# Patient Record
Sex: FEMALE | Race: WHITE | NOT HISPANIC OR LATINO | Employment: STUDENT | URBAN - METROPOLITAN AREA
[De-identification: names, ages, dates, MRNs, and addresses within clinical notes are randomized per-mention and may not be internally consistent; named-entity substitution may affect disease eponyms.]

---

## 2023-06-29 LAB
CHLAMYDIA TRACH., AMPLIFIED: NEGATIVE
N. GONORRHEA, AMPLIFIED: NEGATIVE
TRICHOMONAS VAGINALIS: NEGATIVE

## 2023-06-30 LAB — URINE CULTURE: NORMAL

## 2023-08-22 ENCOUNTER — OFFICE VISIT (OUTPATIENT)
Dept: PRIMARY CARE | Facility: CLINIC | Age: 23
End: 2023-08-22
Payer: COMMERCIAL

## 2023-08-22 VITALS
SYSTOLIC BLOOD PRESSURE: 130 MMHG | WEIGHT: 137 LBS | DIASTOLIC BLOOD PRESSURE: 82 MMHG | HEIGHT: 64 IN | OXYGEN SATURATION: 96 % | HEART RATE: 71 BPM | BODY MASS INDEX: 23.39 KG/M2

## 2023-08-22 DIAGNOSIS — F41.9 ANXIETY: Primary | ICD-10-CM

## 2023-08-22 DIAGNOSIS — Z92.29 UP-TO-DATE WITH IMMUNIZATIONS: ICD-10-CM

## 2023-08-22 PROCEDURE — 99395 PREV VISIT EST AGE 18-39: CPT | Performed by: FAMILY MEDICINE

## 2023-08-22 RX ORDER — SERTRALINE HYDROCHLORIDE 25 MG/1
25 TABLET, FILM COATED ORAL DAILY
Qty: 30 TABLET | Refills: 1 | Status: SHIPPED | OUTPATIENT
Start: 2023-08-22 | End: 2023-09-05

## 2023-08-22 ASSESSMENT — ENCOUNTER SYMPTOMS
RESPIRATORY NEGATIVE: 1
CONSTITUTIONAL NEGATIVE: 1
NEUROLOGICAL NEGATIVE: 1
CARDIOVASCULAR NEGATIVE: 1
NERVOUS/ANXIOUS: 1
HEMATOLOGIC/LYMPHATIC NEGATIVE: 1
GASTROINTESTINAL NEGATIVE: 1
MUSCULOSKELETAL NEGATIVE: 1

## 2023-08-22 NOTE — PROGRESS NOTES
Subjective   Patient ID: Mouna De Dios is a 22 y.o. female.    HPI  Doing patient is doing well mom concerned with anxiety and ADHD I told her we could try an SSRI but I would like to steer clear from the amphetamines at this point  Review of Systems   Constitutional: Negative.    HENT: Negative.     Respiratory: Negative.     Cardiovascular: Negative.    Gastrointestinal: Negative.    Genitourinary: Negative.    Musculoskeletal: Negative.    Neurological: Negative.    Hematological: Negative.    Psychiatric/Behavioral:  The patient is nervous/anxious.        Objective   Physical Exam  Constitutional:       Appearance: Normal appearance.   HENT:      Head: Normocephalic and atraumatic.      Nose: Nose normal.      Mouth/Throat:      Mouth: Mucous membranes are moist.   Eyes:      Extraocular Movements: Extraocular movements intact.      Pupils: Pupils are equal, round, and reactive to light.   Cardiovascular:      Rate and Rhythm: Normal rate.   Pulmonary:      Effort: Pulmonary effort is normal.      Breath sounds: Normal breath sounds.   Abdominal:      General: Abdomen is flat.      Palpations: Abdomen is soft.   Musculoskeletal:         General: Normal range of motion.      Cervical back: Normal range of motion.   Skin:     General: Skin is warm and dry.   Neurological:      Mental Status: She is alert.       Assessment/Plan   There are no diagnoses linked to this encounter.

## 2023-09-05 DIAGNOSIS — F41.9 ANXIETY: ICD-10-CM

## 2023-09-05 RX ORDER — SERTRALINE HYDROCHLORIDE 25 MG/1
25 TABLET, FILM COATED ORAL DAILY
Qty: 90 TABLET | Refills: 0 | Status: SHIPPED | OUTPATIENT
Start: 2023-09-05 | End: 2024-01-02

## 2023-12-18 ENCOUNTER — LAB (OUTPATIENT)
Dept: LAB | Facility: LAB | Age: 23
End: 2023-12-18
Payer: COMMERCIAL

## 2023-12-18 DIAGNOSIS — Z92.29 UP-TO-DATE WITH IMMUNIZATIONS: ICD-10-CM

## 2023-12-18 PROCEDURE — 86787 VARICELLA-ZOSTER ANTIBODY: CPT

## 2023-12-18 PROCEDURE — 86481 TB AG RESPONSE T-CELL SUSP: CPT

## 2023-12-18 PROCEDURE — 36415 COLL VENOUS BLD VENIPUNCTURE: CPT

## 2023-12-18 PROCEDURE — 86706 HEP B SURFACE ANTIBODY: CPT

## 2023-12-18 PROCEDURE — 86735 MUMPS ANTIBODY: CPT

## 2023-12-18 PROCEDURE — 86765 RUBEOLA ANTIBODY: CPT

## 2023-12-18 PROCEDURE — 86317 IMMUNOASSAY INFECTIOUS AGENT: CPT

## 2023-12-19 ENCOUNTER — TELEPHONE (OUTPATIENT)
Dept: PRIMARY CARE | Facility: CLINIC | Age: 23
End: 2023-12-19
Payer: COMMERCIAL

## 2023-12-19 LAB
HBV SURFACE AB SER-ACNC: 5 MIU/ML
MEV IGG SER QL IA: POSITIVE
MUMPS IGG ANTIBODY INDEX: 1.2 IA
MUV IGG SER IA-ACNC: POSITIVE
RUBEOLA IGG ANTIBODY INDEX: 2 IA
RUBV IGG SERPL IA-ACNC: 1.7 IA
RUBV IGG SERPL QL IA: POSITIVE
VARICELLA ZOSTER IGG INDEX: 2.4 IA
VZV IGG SER QL IA: POSITIVE

## 2023-12-20 LAB
NIL(NEG) CONTROL SPOT COUNT: NORMAL
PANEL A SPOT COUNT: 1
PANEL B SPOT COUNT: 0
POS CONTROL SPOT COUNT: NORMAL
T-SPOT. TB INTERPRETATION: NEGATIVE

## 2023-12-22 ENCOUNTER — TELEPHONE (OUTPATIENT)
Dept: PRIMARY CARE | Facility: CLINIC | Age: 23
End: 2023-12-22
Payer: COMMERCIAL

## 2024-01-30 ENCOUNTER — APPOINTMENT (OUTPATIENT)
Dept: PRIMARY CARE | Facility: CLINIC | Age: 24
End: 2024-01-30
Payer: COMMERCIAL

## 2024-01-31 ENCOUNTER — OFFICE VISIT (OUTPATIENT)
Dept: PRIMARY CARE | Facility: CLINIC | Age: 24
End: 2024-01-31
Payer: COMMERCIAL

## 2024-01-31 ENCOUNTER — LAB (OUTPATIENT)
Dept: LAB | Facility: LAB | Age: 24
End: 2024-01-31
Payer: COMMERCIAL

## 2024-01-31 VITALS
HEART RATE: 92 BPM | WEIGHT: 156 LBS | BODY MASS INDEX: 26.63 KG/M2 | DIASTOLIC BLOOD PRESSURE: 74 MMHG | OXYGEN SATURATION: 98 % | HEIGHT: 64 IN | SYSTOLIC BLOOD PRESSURE: 114 MMHG

## 2024-01-31 DIAGNOSIS — E66.3 OVERWEIGHT: ICD-10-CM

## 2024-01-31 DIAGNOSIS — F90.9 ATTENTION DEFICIT HYPERACTIVITY DISORDER (ADHD), UNSPECIFIED ADHD TYPE: ICD-10-CM

## 2024-01-31 DIAGNOSIS — Z13.220 LIPID SCREENING: ICD-10-CM

## 2024-01-31 DIAGNOSIS — Z00.00 ANNUAL PHYSICAL EXAM: Primary | ICD-10-CM

## 2024-01-31 PROCEDURE — 99213 OFFICE O/P EST LOW 20 MIN: CPT | Performed by: STUDENT IN AN ORGANIZED HEALTH CARE EDUCATION/TRAINING PROGRAM

## 2024-01-31 PROCEDURE — 84443 ASSAY THYROID STIM HORMONE: CPT

## 2024-01-31 PROCEDURE — 99395 PREV VISIT EST AGE 18-39: CPT | Performed by: STUDENT IN AN ORGANIZED HEALTH CARE EDUCATION/TRAINING PROGRAM

## 2024-01-31 PROCEDURE — 36415 COLL VENOUS BLD VENIPUNCTURE: CPT

## 2024-01-31 PROCEDURE — 80053 COMPREHEN METABOLIC PANEL: CPT

## 2024-01-31 PROCEDURE — 85025 COMPLETE CBC W/AUTO DIFF WBC: CPT

## 2024-01-31 PROCEDURE — 80061 LIPID PANEL: CPT

## 2024-01-31 PROCEDURE — 1036F TOBACCO NON-USER: CPT | Performed by: STUDENT IN AN ORGANIZED HEALTH CARE EDUCATION/TRAINING PROGRAM

## 2024-01-31 RX ORDER — ATOMOXETINE 40 MG/1
40 CAPSULE ORAL DAILY
Qty: 30 CAPSULE | Refills: 0 | Status: SHIPPED | OUTPATIENT
Start: 2024-02-03 | End: 2024-03-04

## 2024-01-31 NOTE — PROGRESS NOTES
"Subjective   Patient ID: Mouna De Dios is a 23 y.o. female who presents for the following    Assessment/Plan   Preventative Medicine  -UTD on vaccinations  -Sees OB for pap smears. Last pap smear last year   -Lipid panel ordered     #ADHD  -Questionnaire consistent with moderate to severe ADHD (Scanned into chart)  -Pt would like to avoid stimulant/controlled medications  PLAN  Rx Strattera pending labs   No hx of SI or attempt. No major hx of depression.   Side effect profile discussed. Patient is agreeable.       #Fhx of Thyroid disorders  #Overweight  -CBC, CMP, Lipid panel, TSH ordered    HPI  23F presents to establish care. Has a hx of ADHD and was put on zoloft by her previous PCP which did not help. Has trouble concentrating at work and completing tasks. Has childhood hx of ADHD and was previously on stimulant medications as a child. Currently does not want stimulant/controlled substance. Interested in trying strattera as she has heard good things about it. Side effects discussed and she understands. No hx of MDD or SI/attempt. No other complaints at this time.     Denies fevers, chills, weight loss, lightheadedness, dizziness, vision changes, sore throat, runny nose, CP, SOB, cough, palpitations, n/v/d, abd pain, black/bloody stools, arthralgias, or new numbness/weakness/tingling in arms/legs/face.        PMH: ADHD previously treated with zoloft    Surgeries: cosmetic surgery; breast implants 3 years ago    Social Hx  T: denies  A: denies  D: denies    Fhx: thyroid in sister    Occupation:     Single   Sexually active with 1 male partner. Does not use barrier protection.   Not on birth control  Not interested in birth control  No kids         Visit Vitals  /74   Pulse 92   Ht 1.626 m (5' 4\")   Wt 70.8 kg (156 lb)   SpO2 98%   BMI 26.78 kg/m²   Smoking Status Former   BSA 1.79 m²     PHYSICAL EXAM   Physical Exam     Visit Vitals  /74   Pulse 92   Ht 1.626 m (5' 4\")   Wt 70.8 kg " (156 lb)   SpO2 98%   BMI 26.78 kg/m²   Smoking Status Former   BSA 1.79 m²        General: NAD. NCAT. Aox3   HEENT: PERRLA. EOMI. MMM. Nares patent bl.  Cardiovascular: RRR. No MRG. S1/S2 wnl.   Respiratory: CTABL. No acute respiratory distress.   GI: Soft, NT abdomen.   MSK: ROM x 4.   Extremities: No edema. Cap refill < 2 sec.   Skin: No rashes or bruises.   Neuro: Aox3. Cranial Nerves grossly intact. Motor/sensory wnl.   Psych: Mood wnl.     REVIEW OF SYSTEMS     ROS in HPI   Allergies   Allergen Reactions    Loratadine Unknown       Current Outpatient Medications   Medication Sig Dispense Refill    etonogestrel-eluting contraceptive 68 mg implant implant 1 each by subdermal route.      sertraline (Zoloft) 25 mg tablet TAKE 1 TABLET BY MOUTH EVERY DAY 90 tablet 1     No current facility-administered medications for this visit.       Objective     Lab on 12/18/2023   Component Date Value Ref Range Status    Rubeola, IgG 12/18/2023 Positive   Final    Rubeola, IgG Index 12/18/2023 2.0 (H)  <=0.8 IA Final    Mumps, IgG 12/18/2023 Positive (A)  Negative Final    Mumps, IgG Index 12/18/2023 1.2 (H)  <=0.8 IA Final    Varicella Zoster, IgG 12/18/2023 Positive (A)  Negative Final    Varicella Zoster, IgG Index 12/18/2023 2.4 (H)  <=0.8 IA Final    Rubella, IgG 12/18/2023 Positive  Negative Final    Rubella, IgG Index 12/18/2023 1.7  <=0.7 IA IA Final    Hepatitis B Surface AB 12/18/2023 5.0  <10.0 mIU/mL Final    T-SPOT. TB Interpretation 12/18/2023 Negative  Negative Final    Panel A Spot Count 12/18/2023 1   Final    Panel B Spot Count 12/18/2023 0   Final    NIL(NEG) Control Spot Count 12/18/2023 Passed   Final    POS Control Spot Count 12/18/2023 Passed   Final       Radiology: Reviewed imaging in powerchart.  No results found.    No family history on file.  Social History     Socioeconomic History    Marital status: Single     Spouse name: None    Number of children: None    Years of education: None    Highest  education level: None   Occupational History    None   Tobacco Use    Smoking status: Former     Packs/day: 0.25     Years: 0.50     Additional pack years: 0.00     Total pack years: 0.13     Types: Cigarettes    Smokeless tobacco: Never   Substance and Sexual Activity    Alcohol use: Yes     Comment: socially    Drug use: Never    Sexual activity: Yes     Partners: Male     Birth control/protection: None   Other Topics Concern    None   Social History Narrative    None     Social Determinants of Health     Financial Resource Strain: Not on file   Food Insecurity: Not on file   Transportation Needs: Not on file   Physical Activity: Not on file   Stress: Not on file   Social Connections: Not on file   Intimate Partner Violence: Not on file   Housing Stability: Not on file     Past Medical History:   Diagnosis Date    ADHD (attention deficit hyperactivity disorder)     Anxiety     Inadequate sleep hygiene     History of difficulty sleeping    Personal history of other mental and behavioral disorders     History of depression    Vitamin D deficiency, unspecified 01/14/2016    Vitamin D deficiency     Past Surgical History:   Procedure Laterality Date    COSMETIC SURGERY      OTHER SURGICAL HISTORY  01/11/2022    Cosmetic surgery       Charting was completed using voice recognition technology and may include unintended errors.

## 2024-02-01 LAB
ALBUMIN SERPL BCP-MCNC: 4.7 G/DL (ref 3.4–5)
ALP SERPL-CCNC: 64 U/L (ref 33–110)
ALT SERPL W P-5'-P-CCNC: 25 U/L (ref 7–45)
ANION GAP SERPL CALC-SCNC: 12 MMOL/L (ref 10–20)
AST SERPL W P-5'-P-CCNC: 20 U/L (ref 9–39)
BASOPHILS # BLD AUTO: 0.06 X10*3/UL (ref 0–0.1)
BASOPHILS NFR BLD AUTO: 0.4 %
BILIRUB SERPL-MCNC: 0.4 MG/DL (ref 0–1.2)
BUN SERPL-MCNC: 12 MG/DL (ref 6–23)
CALCIUM SERPL-MCNC: 9.9 MG/DL (ref 8.6–10.6)
CHLORIDE SERPL-SCNC: 104 MMOL/L (ref 98–107)
CHOLEST SERPL-MCNC: 155 MG/DL (ref 0–199)
CHOLESTEROL/HDL RATIO: 2.3
CO2 SERPL-SCNC: 25 MMOL/L (ref 21–32)
CREAT SERPL-MCNC: 0.82 MG/DL (ref 0.5–1.05)
EGFRCR SERPLBLD CKD-EPI 2021: >90 ML/MIN/1.73M*2
EOSINOPHIL # BLD AUTO: 0.06 X10*3/UL (ref 0–0.7)
EOSINOPHIL NFR BLD AUTO: 0.4 %
ERYTHROCYTE [DISTWIDTH] IN BLOOD BY AUTOMATED COUNT: 12.4 % (ref 11.5–14.5)
GLUCOSE SERPL-MCNC: 94 MG/DL (ref 74–99)
HCT VFR BLD AUTO: 39 % (ref 36–46)
HDLC SERPL-MCNC: 67.6 MG/DL
HGB BLD-MCNC: 12.7 G/DL (ref 12–16)
IMM GRANULOCYTES # BLD AUTO: 0.05 X10*3/UL (ref 0–0.7)
IMM GRANULOCYTES NFR BLD AUTO: 0.3 % (ref 0–0.9)
LDLC SERPL CALC-MCNC: 75 MG/DL
LYMPHOCYTES # BLD AUTO: 1.63 X10*3/UL (ref 1.2–4.8)
LYMPHOCYTES NFR BLD AUTO: 10.7 %
MCH RBC QN AUTO: 28.1 PG (ref 26–34)
MCHC RBC AUTO-ENTMCNC: 32.6 G/DL (ref 32–36)
MCV RBC AUTO: 86 FL (ref 80–100)
MONOCYTES # BLD AUTO: 1.16 X10*3/UL (ref 0.1–1)
MONOCYTES NFR BLD AUTO: 7.6 %
NEUTROPHILS # BLD AUTO: 12.24 X10*3/UL (ref 1.2–7.7)
NEUTROPHILS NFR BLD AUTO: 80.6 %
NON HDL CHOLESTEROL: 87 MG/DL (ref 0–149)
NRBC BLD-RTO: 0 /100 WBCS (ref 0–0)
PLATELET # BLD AUTO: 300 X10*3/UL (ref 150–450)
POTASSIUM SERPL-SCNC: 4.2 MMOL/L (ref 3.5–5.3)
PROT SERPL-MCNC: 7 G/DL (ref 6.4–8.2)
RBC # BLD AUTO: 4.52 X10*6/UL (ref 4–5.2)
SODIUM SERPL-SCNC: 137 MMOL/L (ref 136–145)
TRIGL SERPL-MCNC: 64 MG/DL (ref 0–149)
TSH SERPL-ACNC: 1.57 MIU/L (ref 0.44–3.98)
VLDL: 13 MG/DL (ref 0–40)
WBC # BLD AUTO: 15.2 X10*3/UL (ref 4.4–11.3)

## 2024-02-02 ENCOUNTER — TELEPHONE (OUTPATIENT)
Dept: PRIMARY CARE | Facility: CLINIC | Age: 24
End: 2024-02-02
Payer: COMMERCIAL

## 2024-02-02 DIAGNOSIS — D72.829 LEUKOCYTOSIS, UNSPECIFIED TYPE: ICD-10-CM

## 2024-02-02 NOTE — TELEPHONE ENCOUNTER
Spoke to pt informed her of results. Pt expressed understanding and agreed to come in to repeat CBC.

## 2024-02-02 NOTE — TELEPHONE ENCOUNTER
----- Message from Kika Mendes MD sent at 2/1/2024  7:49 PM EST -----  Lipids and chemistries okay.   WBC is elevated. May be incidental finding. Would recommend repeat WBC next week. Please place order for CBC and have patient come in M, T, or W in office to have CBC.

## 2024-02-07 ENCOUNTER — LAB (OUTPATIENT)
Dept: LAB | Facility: LAB | Age: 24
End: 2024-02-07
Payer: COMMERCIAL

## 2024-02-07 ENCOUNTER — OFFICE VISIT (OUTPATIENT)
Dept: PRIMARY CARE | Facility: CLINIC | Age: 24
End: 2024-02-07
Payer: COMMERCIAL

## 2024-02-07 VITALS
OXYGEN SATURATION: 97 % | WEIGHT: 156 LBS | SYSTOLIC BLOOD PRESSURE: 124 MMHG | BODY MASS INDEX: 26.63 KG/M2 | HEIGHT: 64 IN | DIASTOLIC BLOOD PRESSURE: 70 MMHG | HEART RATE: 78 BPM

## 2024-02-07 DIAGNOSIS — Z79.899 MEDICATION MANAGEMENT: ICD-10-CM

## 2024-02-07 DIAGNOSIS — D72.829 LEUKOCYTOSIS, UNSPECIFIED TYPE: ICD-10-CM

## 2024-02-07 DIAGNOSIS — Z79.899 MEDICATION MANAGEMENT: Primary | ICD-10-CM

## 2024-02-07 LAB
AMPHETAMINES UR QL SCN: ABNORMAL
BARBITURATES UR QL SCN: ABNORMAL
BASOPHILS # BLD AUTO: 0.04 X10*3/UL (ref 0–0.1)
BASOPHILS NFR BLD AUTO: 0.5 %
BENZODIAZ UR QL SCN: ABNORMAL
BZE UR QL SCN: ABNORMAL
CANNABINOIDS UR QL SCN: ABNORMAL
EOSINOPHIL # BLD AUTO: 0.07 X10*3/UL (ref 0–0.7)
EOSINOPHIL NFR BLD AUTO: 0.9 %
ERYTHROCYTE [DISTWIDTH] IN BLOOD BY AUTOMATED COUNT: 12.3 % (ref 11.5–14.5)
FENTANYL+NORFENTANYL UR QL SCN: ABNORMAL
HCT VFR BLD AUTO: 36.4 % (ref 36–46)
HGB BLD-MCNC: 11.6 G/DL (ref 12–16)
IMM GRANULOCYTES # BLD AUTO: 0.02 X10*3/UL (ref 0–0.7)
IMM GRANULOCYTES NFR BLD AUTO: 0.2 % (ref 0–0.9)
LYMPHOCYTES # BLD AUTO: 1.84 X10*3/UL (ref 1.2–4.8)
LYMPHOCYTES NFR BLD AUTO: 22.7 %
MCH RBC QN AUTO: 27.4 PG (ref 26–34)
MCHC RBC AUTO-ENTMCNC: 31.9 G/DL (ref 32–36)
MCV RBC AUTO: 86 FL (ref 80–100)
MONOCYTES # BLD AUTO: 0.61 X10*3/UL (ref 0.1–1)
MONOCYTES NFR BLD AUTO: 7.5 %
NEUTROPHILS # BLD AUTO: 5.51 X10*3/UL (ref 1.2–7.7)
NEUTROPHILS NFR BLD AUTO: 68.2 %
NRBC BLD-RTO: 0 /100 WBCS (ref 0–0)
OPIATES UR QL SCN: ABNORMAL
OXYCODONE+OXYMORPHONE UR QL SCN: ABNORMAL
PCP UR QL SCN: ABNORMAL
PLATELET # BLD AUTO: 300 X10*3/UL (ref 150–450)
RBC # BLD AUTO: 4.24 X10*6/UL (ref 4–5.2)
WBC # BLD AUTO: 8.1 X10*3/UL (ref 4.4–11.3)

## 2024-02-07 PROCEDURE — 80361 OPIATES 1 OR MORE: CPT

## 2024-02-07 PROCEDURE — 80365 DRUG SCREENING OXYCODONE: CPT

## 2024-02-07 PROCEDURE — 36415 COLL VENOUS BLD VENIPUNCTURE: CPT

## 2024-02-07 PROCEDURE — 1036F TOBACCO NON-USER: CPT | Performed by: STUDENT IN AN ORGANIZED HEALTH CARE EDUCATION/TRAINING PROGRAM

## 2024-02-07 PROCEDURE — 99214 OFFICE O/P EST MOD 30 MIN: CPT | Performed by: STUDENT IN AN ORGANIZED HEALTH CARE EDUCATION/TRAINING PROGRAM

## 2024-02-07 PROCEDURE — 80307 DRUG TEST PRSMV CHEM ANLYZR: CPT

## 2024-02-07 PROCEDURE — 85025 COMPLETE CBC W/AUTO DIFF WBC: CPT

## 2024-02-07 RX ORDER — DEXTROAMPHETAMINE SACCHARATE, AMPHETAMINE ASPARTATE MONOHYDRATE, DEXTROAMPHETAMINE SULFATE AND AMPHETAMINE SULFATE 1.25; 1.25; 1.25; 1.25 MG/1; MG/1; MG/1; MG/1
5 CAPSULE, EXTENDED RELEASE ORAL EVERY MORNING
Refills: 0 | Status: CANCELLED | OUTPATIENT
Start: 2024-02-07 | End: 2024-03-08

## 2024-02-07 NOTE — PROGRESS NOTES
Subjective   Patient ID: Mouna De Dios is a 23 y.o. female who presents for the following        UPDATE:  2/9/2024. UDS is positive for opioids. Pt informed that I cannot prescribe adderall at this time. She will have to follow up with Psychiatry and have ADHD evaluation done. If prescribed via Psychiatry, we will consider doing her refills through the office.       Assessment/Plan   Preventative Medicine  -UTD on vaccinations  -Sees OB for pap smears. Last pap smear last year     #ADHD  -Questionnaire consistent with moderate to severe ADHD (Scanned into chart)  -Pt would like to avoid stimulant/controlled medications  PLAN  Adderall 5mg XR PO daily x 30 days  Follow up in 1 month   No hx of SI or attempt. No major hx of depression.     #Fhx of Thyroid disorders  #Overweight  -TSH wnl  -Lipids okay    #Leukocytosis; NOS  -No fevers, chills, or systemic si/sx  -Pt states that she felt sick when she last saw me but is feeling better now.   -Repeat CBC at next visit     >31 minutes spent on complete E&M of this patient.     OARRS Report Reviewed and appropriate.  UDS reviewed.  I have personally reviewed the OARRS report.  I have considered the risks of abuse, dependence, addiction and diversion. I believe that it is clinically appropriate for this patient to be prescribed this medication based on documented diagnosis.     Controlled Substance Agreement:   I have printed this form and reviewed each line item with the patient and the patient has verbalized understanding.   Date of the last Controlled Substance Agreement: 2/7/2024  Date of UDS 2/7/2024    I discussed patient's OARRS report as well as the potential concern for overdose on prescribed opioid, sleep aid, gabapentin/Lyrica, and/or benzodiazepines. I explained that Overdose Risk Scores >460, require a Narcan prescription and places the patient at risk for potential death.      Patient understands that the risk of death can occur when using opioid,  "benzodiazepines, gabapentin, Lyrica, sleep aids, and illegal drugs. The risk of death especially increases when using the above medications and or illegal drugs in combination. I have reviewed with the patient and the patient is in agreement with the terms of the  Controlled Substance Agreement.      At this point in time, patient is in compliance with the above, and has no signs of dependence or addiction to the above prescribed medications.         HPI    23F presents for followup visit. Has a hx of ADHD and was   put on zoloft by her previous PCP which did not help. Has trouble concentrating at work and completing tasks. She's in school for nursing and she states that she is having trouble concentrating and completing work on time. Previously we were going to do Strattera at the patients request, but after reading of the side effects, patient did not want to get on it. We discussed various options and the risks and benefits of these medications. We are deciding on starting Adderral.       Has childhood hx of ADHD and was previously on stimulant medications as a child.     No hx of MDD or SI/attempt. No other complaints at this time.     Denies fevers, chills, weight loss, lightheadedness, dizziness, vision changes, sore throat, runny nose, CP, SOB, cough, palpitations, n/v/d, abd pain, black/bloody stools, arthralgias, or new numbness/weakness/tingling in arms/legs/face.        PMH: ADHD previously treated with zoloft    Surgeries: cosmetic surgery; breast implants 3 years ago    Social Hx  T: denies  A: denies  D: denies    Fhx: thyroid in sister    Occupation:     Single   Sexually active with 1 male partner. Does not use barrier protection.   Not on birth control  Not interested in birth control  No kids         Visit Vitals  /70   Pulse 78   Ht 1.626 m (5' 4\")   Wt 70.8 kg (156 lb)   SpO2 97%   BMI 26.78 kg/m²   Smoking Status Former   BSA 1.79 m²     PHYSICAL EXAM   Physical Exam     Visit " "Vitals  /70   Pulse 78   Ht 1.626 m (5' 4\")   Wt 70.8 kg (156 lb)   SpO2 97%   BMI 26.78 kg/m²   Smoking Status Former   BSA 1.79 m²        General: NAD. NCAT. Aox3   HEENT: PERRLA. EOMI. MMM. Nares patent bl.  Cardiovascular: RRR. No MRG. S1/S2 wnl.   Respiratory: CTABL. No acute respiratory distress.   GI: Soft, NT abdomen.   MSK: ROM x 4.   Extremities: No edema. Cap refill < 2 sec.   Skin: No rashes or bruises.   Neuro: Aox3. Cranial Nerves grossly intact. Motor/sensory wnl.   Psych: Mood wnl.     REVIEW OF SYSTEMS     ROS in HPI   Allergies   Allergen Reactions    Loratadine Unknown       Current Outpatient Medications   Medication Sig Dispense Refill    atomoxetine (Strattera) 40 mg capsule Take 1 capsule (40 mg) by mouth once daily. Swallow capsule whole; do not open. If opened accidentally, do not touch eyes; wash hands immediately (product is an eye irritant). Do not start before February 3, 2024. (Patient not taking: Reported on 2/7/2024) 30 capsule 0     No current facility-administered medications for this visit.       Objective     Lab on 01/31/2024   Component Date Value Ref Range Status    WBC 01/31/2024 15.2 (H)  4.4 - 11.3 x10*3/uL Final    nRBC 01/31/2024 0.0  0.0 - 0.0 /100 WBCs Final    RBC 01/31/2024 4.52  4.00 - 5.20 x10*6/uL Final    Hemoglobin 01/31/2024 12.7  12.0 - 16.0 g/dL Final    Hematocrit 01/31/2024 39.0  36.0 - 46.0 % Final    MCV 01/31/2024 86  80 - 100 fL Final    MCH 01/31/2024 28.1  26.0 - 34.0 pg Final    MCHC 01/31/2024 32.6  32.0 - 36.0 g/dL Final    RDW 01/31/2024 12.4  11.5 - 14.5 % Final    Platelets 01/31/2024 300  150 - 450 x10*3/uL Final    Neutrophils % 01/31/2024 80.6  40.0 - 80.0 % Final    Immature Granulocytes %, Automated 01/31/2024 0.3  0.0 - 0.9 % Final    Lymphocytes % 01/31/2024 10.7  13.0 - 44.0 % Final    Monocytes % 01/31/2024 7.6  2.0 - 10.0 % Final    Eosinophils % 01/31/2024 0.4  0.0 - 6.0 % Final    Basophils % 01/31/2024 0.4  0.0 - 2.0 % Final    " Neutrophils Absolute 01/31/2024 12.24 (H)  1.20 - 7.70 x10*3/uL Final    Immature Granulocytes Absolute, Au* 01/31/2024 0.05  0.00 - 0.70 x10*3/uL Final    Lymphocytes Absolute 01/31/2024 1.63  1.20 - 4.80 x10*3/uL Final    Monocytes Absolute 01/31/2024 1.16 (H)  0.10 - 1.00 x10*3/uL Final    Eosinophils Absolute 01/31/2024 0.06  0.00 - 0.70 x10*3/uL Final    Basophils Absolute 01/31/2024 0.06  0.00 - 0.10 x10*3/uL Final    Glucose 01/31/2024 94  74 - 99 mg/dL Final    Sodium 01/31/2024 137  136 - 145 mmol/L Final    Potassium 01/31/2024 4.2  3.5 - 5.3 mmol/L Final    Chloride 01/31/2024 104  98 - 107 mmol/L Final    Bicarbonate 01/31/2024 25  21 - 32 mmol/L Final    Anion Gap 01/31/2024 12  10 - 20 mmol/L Final    Urea Nitrogen 01/31/2024 12  6 - 23 mg/dL Final    Creatinine 01/31/2024 0.82  0.50 - 1.05 mg/dL Final    eGFR 01/31/2024 >90  >60 mL/min/1.73m*2 Final    Calcium 01/31/2024 9.9  8.6 - 10.6 mg/dL Final    Albumin 01/31/2024 4.7  3.4 - 5.0 g/dL Final    Alkaline Phosphatase 01/31/2024 64  33 - 110 U/L Final    Total Protein 01/31/2024 7.0  6.4 - 8.2 g/dL Final    AST 01/31/2024 20  9 - 39 U/L Final    Bilirubin, Total 01/31/2024 0.4  0.0 - 1.2 mg/dL Final    ALT 01/31/2024 25  7 - 45 U/L Final    Cholesterol 01/31/2024 155  0 - 199 mg/dL Final    HDL-Cholesterol 01/31/2024 67.6  mg/dL Final    Cholesterol/HDL Ratio 01/31/2024 2.3   Final    LDL Calculated 01/31/2024 75  <=119 mg/dL Final    VLDL 01/31/2024 13  0 - 40 mg/dL Final    Triglycerides 01/31/2024 64  0 - 149 mg/dL Final    Non HDL Cholesterol 01/31/2024 87  0 - 149 mg/dL Final    Thyroid Stimulating Hormone 01/31/2024 1.57  0.44 - 3.98 mIU/L Final   Lab on 12/18/2023   Component Date Value Ref Range Status    Rubeola, IgG 12/18/2023 Positive   Final    Rubeola, IgG Index 12/18/2023 2.0 (H)  <=0.8 IA Final    Mumps, IgG 12/18/2023 Positive (A)  Negative Final    Mumps, IgG Index 12/18/2023 1.2 (H)  <=0.8 IA Final    Varicella Zoster, IgG  12/18/2023 Positive (A)  Negative Final    Varicella Zoster, IgG Index 12/18/2023 2.4 (H)  <=0.8 IA Final    Rubella, IgG 12/18/2023 Positive  Negative Final    Rubella, IgG Index 12/18/2023 1.7  <=0.7 IA IA Final    Hepatitis B Surface AB 12/18/2023 5.0  <10.0 mIU/mL Final    T-SPOT. TB Interpretation 12/18/2023 Negative  Negative Final    Panel A Spot Count 12/18/2023 1   Final    Panel B Spot Count 12/18/2023 0   Final    NIL(NEG) Control Spot Count 12/18/2023 Passed   Final    POS Control Spot Count 12/18/2023 Passed   Final       Radiology: Reviewed imaging in powerchart.  No results found.    No family history on file.  Social History     Socioeconomic History    Marital status: Single     Spouse name: None    Number of children: None    Years of education: None    Highest education level: None   Occupational History    None   Tobacco Use    Smoking status: Former     Packs/day: 0.25     Years: 0.50     Additional pack years: 0.00     Total pack years: 0.13     Types: Cigarettes    Smokeless tobacco: Never   Substance and Sexual Activity    Alcohol use: Yes     Comment: socially    Drug use: Never    Sexual activity: Yes     Partners: Male     Birth control/protection: None   Other Topics Concern    None   Social History Narrative    None     Social Determinants of Health     Financial Resource Strain: Not on file   Food Insecurity: Not on file   Transportation Needs: Not on file   Physical Activity: Not on file   Stress: Not on file   Social Connections: Not on file   Intimate Partner Violence: Not on file   Housing Stability: Not on file     Past Medical History:   Diagnosis Date    ADHD (attention deficit hyperactivity disorder)     Anxiety     Inadequate sleep hygiene     History of difficulty sleeping    Personal history of other mental and behavioral disorders     History of depression    Vitamin D deficiency, unspecified 01/14/2016    Vitamin D deficiency     Past Surgical History:   Procedure  Laterality Date    COSMETIC SURGERY      OTHER SURGICAL HISTORY  01/11/2022    Cosmetic surgery       Charting was completed using voice recognition technology and may include unintended errors.

## 2024-02-08 ENCOUNTER — TELEPHONE (OUTPATIENT)
Dept: PRIMARY CARE | Facility: CLINIC | Age: 24
End: 2024-02-08
Payer: COMMERCIAL

## 2024-02-08 NOTE — TELEPHONE ENCOUNTER
Pt called back and was informed of below.   Pt wants to know if she can re test, stated she does not take opioids and thinks its a false positive. Please advise.

## 2024-02-08 NOTE — TELEPHONE ENCOUNTER
----- Message from Kika Mendes MD sent at 2/8/2024  1:04 PM EST -----  UDS is positive for opioids. Please set up phone visit with patient to discuss this. Unfortunately cannot prescribe controlled substance at this time.

## 2024-02-09 NOTE — TELEPHONE ENCOUNTER
Spoke to pt informed her since postivie results she will need to see psychiatrist. Gave her Dr. Roma Mena phone number and informed her to reach out to her office.

## 2024-02-13 LAB
6MAM UR CFM-MCNC: <25 NG/ML
CODEINE UR CFM-MCNC: 80 NG/ML
HYDROCODONE CTO UR CFM-MCNC: <25 NG/ML
HYDROMORPHONE UR CFM-MCNC: <25 NG/ML
MORPHINE UR CFM-MCNC: 565 NG/ML
NORHYDROCODONE UR CFM-MCNC: <25 NG/ML
NOROXYCODONE UR CFM-MCNC: <25 NG/ML
OXYCODONE UR CFM-MCNC: <25 NG/ML
OXYMORPHONE UR CFM-MCNC: <25 NG/ML

## 2024-02-20 DIAGNOSIS — F90.9 ATTENTION DEFICIT HYPERACTIVITY DISORDER (ADHD), UNSPECIFIED ADHD TYPE: ICD-10-CM

## 2024-02-23 ENCOUNTER — PATIENT MESSAGE (OUTPATIENT)
Dept: PRIMARY CARE | Facility: CLINIC | Age: 24
End: 2024-02-23
Payer: COMMERCIAL

## 2024-02-23 RX ORDER — ATOMOXETINE 40 MG/1
40 CAPSULE ORAL DAILY
Qty: 90 CAPSULE | Refills: 1 | OUTPATIENT
Start: 2024-02-23 | End: 2024-03-24

## 2024-02-23 NOTE — TELEPHONE ENCOUNTER
Medication refused due to failing protocol.    Requested Prescriptions   Pending Prescriptions Disp Refills    atomoxetine (Strattera) 40 mg capsule [Pharmacy Med Name: ATOMOXETINE HCL 40 MG CAPSULE] 90 capsule 1     Sig: Take 1 capsule (40 mg) by mouth once daily. Swallow capsule whole; do not open. If opened accidentally, do not touch eyes; wash hands immediately (product is an eye irritant). Do not start before February 3, 2024.       There is no refill protocol information for this order

## 2024-02-27 ENCOUNTER — APPOINTMENT (OUTPATIENT)
Dept: PRIMARY CARE | Facility: CLINIC | Age: 24
End: 2024-02-27
Payer: COMMERCIAL